# Patient Record
Sex: MALE | Race: WHITE
[De-identification: names, ages, dates, MRNs, and addresses within clinical notes are randomized per-mention and may not be internally consistent; named-entity substitution may affect disease eponyms.]

---

## 2017-08-02 PROBLEM — Z00.00 ENCOUNTER FOR PREVENTIVE HEALTH EXAMINATION: Status: ACTIVE | Noted: 2017-08-02

## 2021-01-04 ENCOUNTER — TRANSCRIPTION ENCOUNTER (OUTPATIENT)
Age: 46
End: 2021-01-04

## 2021-08-14 ENCOUNTER — FORM ENCOUNTER (OUTPATIENT)
Age: 46
End: 2021-08-14

## 2021-08-15 ENCOUNTER — TRANSCRIPTION ENCOUNTER (OUTPATIENT)
Age: 46
End: 2021-08-15

## 2021-08-16 ENCOUNTER — EMERGENCY (EMERGENCY)
Facility: HOSPITAL | Age: 46
LOS: 0 days | Discharge: HOME | End: 2021-08-16
Attending: EMERGENCY MEDICINE
Payer: COMMERCIAL

## 2021-08-16 ENCOUNTER — TRANSCRIPTION ENCOUNTER (OUTPATIENT)
Age: 46
End: 2021-08-16

## 2021-08-16 VITALS
SYSTOLIC BLOOD PRESSURE: 100 MMHG | DIASTOLIC BLOOD PRESSURE: 67 MMHG | OXYGEN SATURATION: 100 % | HEART RATE: 89 BPM | TEMPERATURE: 99 F | RESPIRATION RATE: 20 BRPM

## 2021-08-16 VITALS — TEMPERATURE: 98 F | OXYGEN SATURATION: 98 % | HEART RATE: 98 BPM | RESPIRATION RATE: 16 BRPM

## 2021-08-16 DIAGNOSIS — U07.1 COVID-19: ICD-10-CM

## 2021-08-16 DIAGNOSIS — R51.9 HEADACHE, UNSPECIFIED: ICD-10-CM

## 2021-08-16 DIAGNOSIS — R50.9 FEVER, UNSPECIFIED: ICD-10-CM

## 2021-08-16 PROCEDURE — M0243: CPT | Mod: 52

## 2021-08-16 PROCEDURE — L9998: CPT

## 2021-08-16 RX ORDER — SODIUM CHLORIDE 9 MG/ML
250 INJECTION INTRAMUSCULAR; INTRAVENOUS; SUBCUTANEOUS
Refills: 0 | Status: COMPLETED | OUTPATIENT
Start: 2021-08-16 | End: 2021-08-16

## 2021-08-16 RX ADMIN — SODIUM CHLORIDE 250 MILLILITER(S): 9 INJECTION INTRAMUSCULAR; INTRAVENOUS; SUBCUTANEOUS at 12:21

## 2021-08-16 RX ADMIN — SODIUM CHLORIDE 25 MILLILITER(S): 9 INJECTION INTRAMUSCULAR; INTRAVENOUS; SUBCUTANEOUS at 10:49

## 2021-08-16 NOTE — ED STATDOCS - PROGRESS NOTE DETAILS
PA:  Patient received monoclonal antibody infusion with no adverse reaction. Patient planned for discharge home and follow up with outpatient [facility] program. ~Russ Espinoza PA-C PA note: Patient re-examined and re-evaluated. Patient feels much better at this time. ED evaluation, Diagnosis and management discussed with the patient in detail. Discussed with ED attending, OK to dc home. Close PMD follow up encouraged.  Strict ED return instructions discussed in detail and patient given the opportunity to ask any questions about their discharge diagnosis and instructions. Patient verbalized understanding. ~ Russ Espinoza PA-C Patient has history of COVID-19 symptoms for [ 2 ] days    Patient has high risk factors that include:  [  ] Chronic Kidney Disease [  ] Diabetes Mellitus [  ] Immune Suppressive [  ] Receiving Immunosuppressive Treatment [ X ] Overweight/Obesity BMI greater than 25 [  ] Age greater than or equal 65 years [  ] Cardiovascular Disease [  ] HTN [  ] COPD/Other Chronic Respiratory Disease [  ] Sickle Cell Disease [  ] Congenital/Acquired Heart Disease [  ] Neurodevelopmental Disorder [  ] Medical related technology dependence [  ] Asthma/Chronic Respiratory Disease [  ] Immune Suppressive Disease [  ]     Patient provided explanation of monoclonal antibody infusion and is in agreement with treatment plan. See consent form in medical record. ~Russ Espinoza PA-C PA: Patient COVID + with risk factors. Patient here for Monoclonal Antibody infusion. Patient denies being pregnant. Additionally, discussion with patient included that receiving this infusion may or may not result in future infertility issues. Patient is aware receiving this infusion is at her discretion, she understands these risks and has agreed to receiving a Monoclonal Antibody infusion. ~Russ Espinoza PA-C

## 2021-08-16 NOTE — ED STATDOCS - NSFOLLOWUPINSTRUCTIONS_ED_ALL_ED_FT
COVID-19 (CORONAVIRUS DISEASE 2019) - Ambulatory Care     COVID-19 (Coronavirus Disease 2019)    AMBULATORY CARE:    Coronavirus disease 2019 (COVID-19) is the disease caused by the novel (new) coronavirus first discovered in [place] in late 2019. Coronavirus is the name of a group of viruses that generally cause respiratory (nose, throat, and lung) infections, such as a cold. They can also cause serious infections that lead to severe respiratory disorders. The new virus can cause serious lower respiratory problems, such as pneumonia. This is because it can get deeper into the lungs than some other coronaviruses. Your risk for serious problems is higher if you are 65 years or older. A weak immune system, diabetes, or a heart or lung condition can also increase your risk.    Signs and symptoms usually start about 5 days after infection, but it can take 2 to 14 days. You may have any of the following:     Fever, a cough, and shortness of breath (these 3 are the most common)      Sudden pain in your chest when you breathe that is worse when you cough or breathe deeply      Feeling more tired than usual      Body aches or a headache    If you think you or someone you know may be infected: It is important for anyone who may be infected to get tested right away. Do the following to protect others:     If emergency care is needed, tell the  about the possible infection, or call ahead and tell the emergency department.      Call a healthcare provider to be seen in the office. Anyone who may be infected should not arrive without calling first. The provider will need to protect staff members and other patients.      The person who may be infected needs to wear a medical mask before getting medical care. The mask needs to stay on until the provider says to remove it.    Call your local emergency number (911 in the [place]) or go to the emergency department if:     You have difficulty breathing or shortness of breath.      You have chest pain or pressure that last longer than 5 minutes.      You become confused or hard to awake.      Your lips or face are blue.      You have a fever of 104°F (40°C) or higher.    Call your doctor if: You do not have signs or symptoms of COVID-19, but any of the following is true:     You traveled within the last 14 days to an area where the virus is active or had close contact with someone who did.      You had close contact within the last 14 days with someone who has a confirmed infection.      You have questions or concerns about your condition or care.    How COVID-19 is diagnosed: If you think you were exposed to the novel coronavirus, call your healthcare provider. Tell him or her about the possible exposure. You may need to make an appointment to be tested.    Samples will be taken from your nose and throat. The samples have to be sent to the Centers for Disease Control and Prevention (CDC) to be checked or confirmed.      CAT scans or x-rays may be used to check for signs of pneumonia. The 2019 coronavirus causes a specific kind of pneumonia, usually in both lungs.    Treatment: COVID-19 cannot be cured. Treatment depends on how severe your symptoms are:     Medicine may be given to help relieve your cough or fever, or to help you breathe more easily.      Mild symptoms may get better on their own. If you do not need to be treated in a hospital, you will be given instructions to use at home. Your condition will be closely monitored. You will need to watch for worsening symptoms and seek immediate care if needed.      Severe, life-threatening symptoms are treated in the hospital. The virus may damage the air sacs of the lungs. The lungs become inflamed and scarred. This can lead to respiratory failure. Respiratory failure means you cannot breathe well enough to get oxygen into your blood. Your organs can fail without enough oxygen. You may be given extra oxygen in the hospital. A machine may be used to help you breathe. Organ failure will be monitored and treated, if needed.    How the 2019 coronavirus spreads: The virus appears to spread quickly and easily. The following are ways the virus is thought to spread, but more information may be coming:     Droplets are the most common way all coronaviruses spread. The virus can travel in droplets that form when a person talks, coughs, or sneezes. Anyone who breathes in the droplets or gets them in his or her eyes can become infected with the virus.      An infected person may be able to leave the virus on objects and surfaces. Another person can get the virus on his or her hands by touching the object or surface. Infection happens if the person then touches his or her eyes or mouth with unwashed hands. It is not yet known how long the virus can stay on an object or surface. That is why it is important to clean all surfaces that are used regularly.      Person-to-person contact may spread the virus. For example, an infected person can spread the virus by shaking hands with someone. At this time, it does not appear that the virus can be passed to a baby during pregnancy or delivery. The virus also does not appear to spread during breastfeeding. If you are pregnant or breastfeeding, talk to your healthcare provider or obstetrician about any concerns you have.      An infected animal may be able to infect a person who touches it. This may happen at live markets or on a farm.    Prevent a 2019 coronavirus infection: No vaccine is available yet for the new coronavirus, but the following can help you prevent an infection:          Wash your hands often. Wash your hands several times each day. Wash after you use the bathroom, change a child's diaper, and before you prepare or eat food. Use soap and water every time. Rub your soapy hands together, lacing your fingers. Wash the front and back of your hands, and in between your fingers. Use the fingers of one hand to scrub under the fingernails of the other hand. Wash for at least 20 seconds. Rinse with warm, running water for several seconds. Then dry your hands with a clean towel or paper towel. Use hand  that contains alcohol if soap and water are not available. Do not touch your eyes, nose, or mouth without washing your hands first.Handwashing           Cover a sneeze or cough. Use a tissue that covers your mouth and nose. Throw the tissue away in a trash can right away. Use the bend of your arm if a tissue is not available. Then wash your hands well with soap and water or use a hand . Do not stand close to anyone who is sneezing or coughing.      Social distancing is recommended. The best way to prevent infection is to avoid anyone who is infected, but this may be difficult. An infected person may be able to spread the virus before signs or symptoms begin. The virus can also be spread for a few days after a person recovers. Until more is known, limit contact with others. Avoid crowds as much as possible. Do not shake hands with, hug, or kiss a person as a greeting. If you do shake hands, wash your hands or use hand  as soon as possible. You do not need to wear a medical mask if you are well and not caring for an infected person.      Ask about vaccines you may need. A vaccine for the new coronavirus is not yet available. Any infection can affect your immune system. A weakened immune system makes you more vulnerable to the new coronavirus. Talk to your healthcare provider about your vaccine history. He or she will tell you which vaccines you need, and when to get them.  Get the influenza (flu) vaccine as soon as recommended each year. The flu vaccine is available starting in September or October. Flu viruses change, so it is important to get a flu vaccine every year. A flu infection can make COVID-19 worse if you have them at the same time. The flu can also cause signs and symptoms that are similar to COVID-19.      Get the pneumonia vaccine if recommended. This vaccine is usually recommended every 5 years. Your provider will tell you when to get this vaccine, if needed.    If you have COVID-19: Healthcare providers will give you specific instructions to follow if you are not treated in a hospital. The following are general guidelines to remind you of how to keep others safe until you are well:     Limit close contact with others. Your healthcare provider will tell you when it is okay to be around others. This may be when you do not have a fever, do not take fever medicine, and have no symptoms. Until then, do the following along with any instructions from your provider:   Only go out of the house for medical appointments. Always call the provider’s office first so he or she can prepare to keep others safe.      Stay at least 6 feet (2 meters) away from others.      Sleep in a different room from others in the house.      Do not shake hands with other people.      Do not use public transportation, such as a bus or the subway.      Wear a medical mask when others are near you. This can help prevent droplets from spreading the virus when you talk, sneeze, or cough.      Do not share items. Do not share dishes, towels, or other items with anyone. Items need to be washed after you use them.      Do not handle live animals. Until more is known, it is best not to touch, play with, or handle live animals. No animals, including pets, have been found to be infected with the new coronavirus, but infection is possible. Do not handle or care for animals until you are well. Care includes feeding, petting, and cuddling your pet. Do not let your pet lick you or share your food. Ask someone who is not infected to take care of your pet, if possible. If you must care for a pet, wear a medical mask. Wash your hands before and after you give care.    Self-care:     Drink more liquids as directed. Liquids will help thin and loosen mucus so you can cough it up. Liquids will also help prevent dehydration. Liquids that help prevent dehydration include water, fruit juice, and broth. Do not drink liquids that contain caffeine. Caffeine can increase your risk for dehydration. Ask your healthcare provider how much liquid to drink each day.      Soothe a sore throat. Gargle with warm salt water. This may help your sore throat feel better. Make salt water by dissolving ¼ teaspoon salt in 1 cup warm water. You may also suck on hard candy or throat lozenges. You may use a sore throat spray.      Use a humidifier or vaporizer. Use a cool mist humidifier or a vaporizer to increase air moisture in your home. This may make it easier for you to breathe and help decrease your cough.      Use saline nasal drops as directed. These help relieve congestion.      Apply petroleum-based jelly around the outside of your nostrils. This can decrease irritation from blowing your nose.      Do not smoke. Nicotine and other chemicals in cigarettes and cigars can make your symptoms worse. They can also cause infections such as bronchitis or pneumonia. Ask your healthcare provider for information if you currently smoke and need help to quit. E-cigarettes or smokeless tobacco still contain nicotine. Talk to your healthcare provider before you use these products.    If you take care of someone who has COVID-19:     Wear a medical mask when you are near the person. A healthcare provider can tell you which kind of mask to wear. You may need a mask that shields your eyes, nose, and mouth. This can help protect you from droplets that carry the virus when the person talks, sneezes, or coughs.      Do not allow others to go near the person. No one should come to the person's home unless it is necessary. Keep the room's door shut unless you need to go in or out.      Make sure the person's room has good air flow. You may be able to open the window if the weather allows. An air conditioner can also be turned on to help air move.      Clean items the person uses or touches. Wear disposable gloves to handle the person's laundry. Place the laundry in a plastic bag. Use hot water and soap to wash the person's laundry. Throw your gloves away after you use them. Wash eating utensils and other items after the person uses them. Items the person uses often should be cleaned daily. These items include phones, doorknobs, light switches, and remote controls. Clean the shower or bathtub after each use.      Clean surfaces often. Use a disinfecting wipe, a single-use sponge, or a cloth you can wash and reuse. Use disinfecting  if you do not have wipes. You can create a disinfecting  by mixing 1 part bleach with 10 parts water. In the kitchen, clean countertops, cooking surfaces, and the fronts and insides of the microwave and refrigerator. In the bathroom, clean the toilet, the area around the toilet, the sink, the area around the sink, and faucets. Clean surfaces in the person's room, such as a desk or dresser.    Follow up with your doctor as directed: Write down your questions so you remember to ask them during your visits.                                                                                                                                                     IV Infusion Therapy    IV (intravenous) infusion therapy is a type of treatment to deliver a liquid substance (infusion) directly into a vein. You may have IV infusion therapy to receive an infusion of:  •Fluids.      •Medicines.      •Nutrition.      •Chemotherapy. This is the use of medicines to stop or slow the growth of cancer cells.      •Blood or blood products.      •X-ray dye that is given before an imaging procedure, like an MRI or CT scan.        Tell a health care provider about:    •Any allergies you have.      •All medicines you are taking, including vitamins, herbs, eye drops, creams, and over-the-counter medicines.      •Any problems you or family members have had with anesthetic medicines or X-ray dyes.      •Any blood disorders you have.      •Any surgeries you have had.      •Any medical conditions you have.      •Whether you are pregnant or may be pregnant.      •Any history of IV drug use.      •Any history of health care providers not being able to find a vein for an IV or blood draw.        What are the risks?  Generally, this is a safe procedure. However, problems may occur, including:  •Pain.      •Bruising.      •Bleeding.      •Infection.      •Failure to place the catheter due to inability to find a vein.      •Leaking or blockage of the catheter (infiltration).      •Damage to blood vessels or nerves.      •Allergic reactions to medicines or dyes.      •A blood clot.        What happens before the procedure?    •Follow instructions from your health care provider about eating or drinking restrictions.      •Ask your health care provider about changing or stopping your regular medicines. This is especially important if you are taking diabetes medicines or blood thinners.      •Learn as much as you can about your treatment. Ask your health care provider for reliable resources, such as websites, books, videos, and people, to help you learn about the treatment you will be having.        What happens during the procedure?                IV infusion therapy starts with a procedure to place a small, thin tube (catheter) into a vein. An IV tube will be attached to the catheter to allow the infusion to flow into your bloodstream. Your catheter may be placed:  •Into a vein that is usually in the elbow, forearm, or back of the hand (peripheral IV catheter). This type of catheter may need to be inserted into a vein each time you get an infusion.      •Into a vein near your elbow (midline catheter or PICC). This type of catheter may stay in place for weeks or months at a time so you can receive repeated infusions through it.      •Into a vein near your neck that leads to your heart (non-tunneled catheter). This type of catheter is only used for short amounts of time because it can cause infection.      •Through the skin of your chest and into a large vein that leads to your heart (tunneled catheter). This type of catheter may stay in your body for months or years.    •So that it connects to an implanted port. An implanted port is a device that is surgically inserted under the skin of the chest to provide long-term IV access. The catheter will connect the port to a large vein in the chest or upper arm.  •A port may be kept in place for many months or years.       •Each time you have an infusion, a needle will be inserted through your skin to connect the catheter to the port.      After your catheter is placed, your health care team will lower your risk of infection by:  •Washing or sanitizing their hands.      •Cleaning the IV site with a germ-killing (antiseptic) solution.    To start the infusion, your health care provider will:  •Attach the IV tubing to your catheter.      •Use a tape or an adhesive bandage (dressing) to hold the catheter and tubing in place against your skin.      •Use an IV pump to control the flow of the IV infusion, if needed.    During the infusion, your health care provider will check the area to make sure:  •There is no swelling.      •Your IV infusion is flowing through the catheter properly.    After the infusion, your health care provider will:  •Remove the dressing or tape.      •Disconnect the tubing from the catheter.      •Remove the catheter, if you have a peripheral IV.      •Apply pressure over the IV insertion site to stop bleeding, then cover the area with a bandage (dressing).      If you have an implanted port, PICC, non-tunneled, or tunneled catheter, your health care team may leave the catheter in place. This depends on your treatment, your medical condition, and what type of catheter you have.    The procedure may vary among health care providers and hospitals.      What can I expect after the procedure?    •Your blood pressure, heart rate, breathing rate, and blood oxygen level may be monitored.        Follow these instructions at home:    •Take over-the-counter and prescription medicines only as told by your health care provider.      •Change or remove any dressings only as told by your health care provider.      •Return to your normal activities as told by your health care provider. Ask your health care provider what activities are safe for you.      • Do not take baths, swim, or use a hot tub until your health care provider approves. Ask your health care provider if you may take showers.    •Check your IV insertion site every day for signs of infection. Check for:  •Redness, swelling, or pain.      •Fluid or blood. If fluid or blood drains from your IV site, use your hands to press down firmly on the area for a minute or two. Doing this should stop the bleeding.      •Warmth.      •Pus or a bad smell.        •Keep all follow-up visits as told by your health care provider. This is important.        Contact a health care provider if:    •You have signs of infection around your IV site.      •You have a fever or chills.      •You have fluid or blood coming from your IV site that does not stop after you apply pressure to the site.      •You have itchy skin.      •You have a rash.      •You have red, itchy, swollen patches of skin (hives).        Get help right away if:    •You have trouble breathing.      •You have chest pain.      These symptoms may represent a serious problem that is an emergency. Do not wait to see if the symptoms will go away. Get medical help right away. Call your local emergency services (911 in the [place] Do not drive yourself to the hospital.       Summary    •IV (intravenous) infusion therapy is a type of treatment to deliver a liquid substance (infusion) directly into a vein.      •Check your IV insertion site every day for signs of infection, such as redness or swelling.      •Change or remove bandages (dressings) only as told by your health care provider.      •Call your health care provider if you notice any signs of infection around your IV site or have reactions to your IV therapy.      This information is not intended to replace advice given to you by your health care provider. Make sure you discuss any questions you have with your health care provider.

## 2021-08-16 NOTE — ED STATDOCS - PHYSICAL EXAMINATION
PA note: Constitutional: NAD AAOx3  Eyes: EOMI, pupils equal  Head: Normocephalic, atraumatic  ENT: Throat is clear without erythema. No exudates. Airway is patent.  Mouth: no airway obstruction.   Cardiac: S1 S2. regular rate   Resp: Non-labored breathing, no tachypnea. Lungs CTA b/l. No w/r/r. Equal chest expansion and rise. O2sat 100% RA  GI: Abd soft. NT/ND.   Neuro: CN2-12 intact. No focal deficits.   Skin: No rashes  ~Russ Espinoza PA-C

## 2021-08-16 NOTE — ED STATDOCS - NS ED ROS FT
ROS: Constitutional- +fever, +chills.  Respiratory- +cough, +SOB  Cardiac- no chest pain, no palpitations, ENT- +rhinorrhea, +sore throat, +congestion. No loss of sense of smell or taste. Abdomen- No nausea, no vomiting, no diarrhea.  Urinary- no dysuria, no urgency, no frequency.  Skin- No rashes ~ Russ Esipnoza PA-C

## 2021-08-16 NOTE — ED STATDOCS - PATIENT PORTAL LINK FT
You can access the FollowMyHealth Patient Portal offered by [facility] by registering at the following website: [email or web address]. By joining [employer]’s FollowMyHealth portal, you will also be able to view your health information using other applications (apps) compatible with our system.

## 2021-08-16 NOTE — ED STATDOCS - CLINICAL SUMMARY MEDICAL DECISION MAKING FREE TEXT BOX
PA note: Patient re-examined and re-evaluated. Patient feels much better at this time. ED evaluation, Diagnosis and management discussed with the patient in detail. Discussed with ED attending, OK to dc home. Close PMD follow up encouraged.  Strict ED return instructions discussed in detail and patient given the opportunity to ask any questions about their discharge diagnosis and instructions. Patient verbalized understanding. ~ Russ Espinoza PA-C

## 2021-08-16 NOTE — ED STATDOCS - OBJECTIVE STATEMENT
PA: Patient presents to [facility] with c/o fever, cough, SOB, HA's, runny nose, sinus congestion, body aches, sore throat x 2 days. DENIES any loss of sense of smell or taste. Patient confirmed +hoang for COVID19 yesterday at [facility]. Patient is here for monoclonal ab's. ~Russ Espinoza PA-C

## 2021-08-16 NOTE — ED STATDOCS - CHIEF COMPLAINT
The patient is a 46y year old Male complaining of  The patient is a 46y year old Male complaining of +COVID

## 2021-08-31 ENCOUNTER — TRANSCRIPTION ENCOUNTER (OUTPATIENT)
Age: 46
End: 2021-08-31

## 2022-11-22 ENCOUNTER — NON-APPOINTMENT (OUTPATIENT)
Age: 47
End: 2022-11-22